# Patient Record
Sex: MALE | Race: BLACK OR AFRICAN AMERICAN | NOT HISPANIC OR LATINO | URBAN - METROPOLITAN AREA
[De-identification: names, ages, dates, MRNs, and addresses within clinical notes are randomized per-mention and may not be internally consistent; named-entity substitution may affect disease eponyms.]

---

## 2018-06-05 ENCOUNTER — OFFICE VISIT (OUTPATIENT)
Dept: URBAN - METROPOLITAN AREA CLINIC 33 | Facility: CLINIC | Age: 73
End: 2018-06-05
Payer: COMMERCIAL

## 2018-06-05 DIAGNOSIS — H35.371 PUCKERING OF MACULA, RIGHT EYE: ICD-10-CM

## 2018-06-05 PROCEDURE — 99214 OFFICE O/P EST MOD 30 MIN: CPT | Performed by: OPHTHALMOLOGY

## 2018-06-05 RX ORDER — KETOROLAC TROMETHAMINE 5 MG/ML
0.5 % SOLUTION OPHTHALMIC
Qty: 5 | Refills: 1 | Status: INACTIVE
Start: 2018-06-05 | End: 2018-09-17

## 2018-06-05 RX ORDER — PREDNISOLONE ACETATE 10 MG/ML
1 % SUSPENSION/ DROPS OPHTHALMIC
Qty: 0 | Refills: 1 | Status: INACTIVE
Start: 2018-06-05 | End: 2018-06-20

## 2018-06-05 RX ORDER — OFLOXACIN 3 MG/ML
0.3 % SOLUTION/ DROPS OPHTHALMIC
Qty: 5 | Refills: 1 | Status: INACTIVE
Start: 2018-06-05 | End: 2018-07-20

## 2018-06-05 ASSESSMENT — KERATOMETRY
OD: 43.38
OS: 43.38

## 2018-06-05 ASSESSMENT — INTRAOCULAR PRESSURE
OS: 12
OD: 12

## 2018-06-05 ASSESSMENT — VISUAL ACUITY
OS: HM
OD: 20/100

## 2018-06-05 NOTE — IMPRESSION/PLAN
Impression: Puckering of macula, right eye: H35.371. OD. Vision: vision affected.  Plan: see Note #2

## 2018-06-05 NOTE — IMPRESSION/PLAN
Impression: Central retinal artery occlusion, left eye: H34.12 OS. Condition: severe loss of vision - irreversible.  Plan: F/U with Dr Jesse Perez as scheduled

## 2018-06-05 NOTE — IMPRESSION/PLAN
Impression: Age-related nuclear cataract, bilateral: H25.13. Condition: quality of life issue. Symptoms: could improve with surgery. Vision: vision affected. Plan: Cataracts account for some decreased vision, however, the patient is aware with macular changes that level of vision post operatively cannot be determined. Discussed risks, benefits, procedures and recovery. Patient desires surgery, recommend phacoemulsification with intraocular lens. RL-2 Recommend standard IOL aim plano.

## 2018-06-20 ENCOUNTER — PRE-OPERATIVE VISIT (OUTPATIENT)
Dept: URBAN - METROPOLITAN AREA CLINIC 17 | Facility: CLINIC | Age: 73
End: 2018-06-20
Payer: COMMERCIAL

## 2018-06-20 DIAGNOSIS — H25.13 AGE-RELATED NUCLEAR CATARACT, BILATERAL: Primary | ICD-10-CM

## 2018-06-20 PROCEDURE — 92136 OPHTHALMIC BIOMETRY: CPT | Performed by: OPHTHALMOLOGY

## 2018-06-20 RX ORDER — PREDNISOLONE ACETATE 10 MG/ML
1 % SUSPENSION/ DROPS OPHTHALMIC
Qty: 1 | Refills: 1 | Status: INACTIVE
Start: 2018-06-20 | End: 2018-09-17

## 2018-06-20 ASSESSMENT — PACHYMETRY
OS: 2.66
OS: 23.08
OD: 22.53
OD: 23.40

## 2018-07-12 ENCOUNTER — SURGERY (OUTPATIENT)
Dept: URBAN - METROPOLITAN AREA SURGERY 11 | Facility: SURGERY | Age: 73
End: 2018-07-12
Payer: COMMERCIAL

## 2018-07-12 PROCEDURE — 66984 XCAPSL CTRC RMVL W/O ECP: CPT | Performed by: OPHTHALMOLOGY

## 2018-07-13 ENCOUNTER — POST-OPERATIVE VISIT (OUTPATIENT)
Dept: URBAN - METROPOLITAN AREA CLINIC 17 | Facility: CLINIC | Age: 73
End: 2018-07-13

## 2018-07-13 PROCEDURE — 99024 POSTOP FOLLOW-UP VISIT: CPT | Performed by: OPTOMETRIST

## 2018-07-13 ASSESSMENT — INTRAOCULAR PRESSURE
OS: 12
OD: 12

## 2018-07-20 ENCOUNTER — POST-OPERATIVE VISIT (OUTPATIENT)
Dept: URBAN - METROPOLITAN AREA CLINIC 18 | Facility: CLINIC | Age: 73
End: 2018-07-20

## 2018-07-20 DIAGNOSIS — Z09 ENCNTR FOR F/U EXAM AFT TRTMT FOR COND OTH THAN MALIG NEOPLM: Primary | ICD-10-CM

## 2018-07-20 PROCEDURE — 99024 POSTOP FOLLOW-UP VISIT: CPT | Performed by: OPTOMETRIST

## 2018-07-20 ASSESSMENT — INTRAOCULAR PRESSURE
OD: 8
OS: 8

## 2018-08-15 ENCOUNTER — POST-OPERATIVE VISIT (OUTPATIENT)
Dept: URBAN - METROPOLITAN AREA CLINIC 17 | Facility: CLINIC | Age: 73
End: 2018-08-15

## 2018-08-15 PROCEDURE — 99024 POSTOP FOLLOW-UP VISIT: CPT | Performed by: OPTOMETRIST

## 2018-08-15 ASSESSMENT — INTRAOCULAR PRESSURE
OD: 13
OS: 12

## 2018-08-15 ASSESSMENT — VISUAL ACUITY: OD: 20/200

## 2018-09-17 ENCOUNTER — OFFICE VISIT (OUTPATIENT)
Dept: URBAN - METROPOLITAN AREA CLINIC 17 | Facility: CLINIC | Age: 73
End: 2018-09-17
Payer: COMMERCIAL

## 2018-09-17 PROCEDURE — 92014 COMPRE OPH EXAM EST PT 1/>: CPT | Performed by: OPHTHALMOLOGY

## 2018-09-17 PROCEDURE — 92134 CPTRZ OPH DX IMG PST SGM RTA: CPT | Performed by: OPHTHALMOLOGY

## 2018-09-17 ASSESSMENT — INTRAOCULAR PRESSURE
OS: 10
OD: 9

## 2018-09-17 NOTE — IMPRESSION/PLAN
Impression: Type 2 diab w moderate nonprlf diab rtnop w macular edema, bilateral: B15.0732. OU. Condition: unstable OD / OS. Vision: vision affected OU. Plan: Discussed diagnosis in detail with patient. Discussed risks of progression. Exam OD shows active ERM with CME, Based on today's exam, diagnostic studies and review of records, recommend to start with AVASTIN and KENALOG FIFI tx in order to help reduce the swelling and prevent a further reduction in vision. Discussed the risks and benefits of tx. Patient elects to proceed with recommendation. OCT shows ERM w/ edema Patient may need additional FIFI tx or laser treatment in the future. Exam OS shows active edema with retinal artery occlusion and OCT OS shows thinning c/w Atrophy and artery occlusion. No treatment is recommend, loss of vision is permanent. Recommend observation.

## 2018-09-17 NOTE — IMPRESSION/PLAN
Impression: Central retinal artery occlusion, left eye: H34.12 OS. Condition: severe loss of vision - irreversible. Plan: Discussed diagnosis in detail with patient. Exam OS show retinal artery occlusion and OCT OS shows thinning c/w Atrophy and artery occlusion. No treatment is recommend, loss of vision is permanent. Recommend observation.

## 2018-09-17 NOTE — IMPRESSION/PLAN
Impression: Puckering of macula, right eye: H35.371. OD. Vision: vision affected. Plan: Discussed diagnosis in detail with patient. Exam and OCT OD shows ERM w/CME, see above notes.

## 2018-10-25 ENCOUNTER — OFFICE VISIT (OUTPATIENT)
Dept: URBAN - METROPOLITAN AREA CLINIC 17 | Facility: CLINIC | Age: 73
End: 2018-10-25
Payer: COMMERCIAL

## 2018-10-25 DIAGNOSIS — E11.3313 TYPE 2 DIAB W MODERATE NONPRLF DIAB RTNOP W MACULAR EDEMA, BILATERAL: Primary | ICD-10-CM

## 2018-10-25 PROCEDURE — 92134 CPTRZ OPH DX IMG PST SGM RTA: CPT | Performed by: OPHTHALMOLOGY

## 2018-10-25 PROCEDURE — 67028 INJECTION EYE DRUG: CPT | Performed by: OPHTHALMOLOGY

## 2018-10-25 PROCEDURE — 99213 OFFICE O/P EST LOW 20 MIN: CPT | Performed by: OPHTHALMOLOGY

## 2018-10-25 ASSESSMENT — INTRAOCULAR PRESSURE
OS: 10
OD: 10

## 2018-10-25 NOTE — IMPRESSION/PLAN
Impression: Type 2 diab w moderate nonprlf diab rtnop w macular edema, bilateral: E28.9383. OU. Condition: unstable OD / OS. Vision: vision affected OU. Plan: Discussed diagnosis in detail with patient. Discussed risks of progression. Exam OD shows active ERM with CME, Based on today's exam, diagnostic studies and review of records, recommend to start with AVASTIN and KENALOG FIFI tx in order to help reduce the swelling and prevent a further reduction in vision. Discussed the risks and benefits of tx. Patient elects to proceed with recommendation. OCT shows ERM w/ extensive edema OD Patient may need additional FIFI tx or laser treatment in the future. Exam OS shows active edema with retinal artery occlusion and OCT OS shows thinning c/w Atrophy and artery occlusion. No treatment is recommend, loss of vision is permanent. Recommend observation.

## 2018-11-02 ENCOUNTER — OFFICE VISIT (OUTPATIENT)
Dept: URBAN - METROPOLITAN AREA CLINIC 17 | Facility: CLINIC | Age: 73
End: 2018-11-02
Payer: COMMERCIAL

## 2018-11-02 PROCEDURE — 99213 OFFICE O/P EST LOW 20 MIN: CPT | Performed by: OPTOMETRIST

## 2018-11-02 ASSESSMENT — INTRAOCULAR PRESSURE
OS: 11
OD: 10

## 2018-11-02 NOTE — IMPRESSION/PLAN
Impression: Type 2 diab w moderate nonprlf diab rtnop w macular edema, bilateral: X07.6497. OU. Condition: unstable OD / OS. Vision: vision affected OU.
s/p Avastin/Kenalog OD Plan: Discussed diagnosis in detail with patient. IOP WNL today. Continue care with Dr. Audra Resendez.

## 2018-11-28 ENCOUNTER — OFFICE VISIT (OUTPATIENT)
Dept: URBAN - METROPOLITAN AREA CLINIC 17 | Facility: CLINIC | Age: 73
End: 2018-11-28
Payer: COMMERCIAL

## 2018-11-28 DIAGNOSIS — H34.12 CENTRAL RETINAL ARTERY OCCLUSION, LEFT EYE: ICD-10-CM

## 2018-11-28 PROCEDURE — 92134 CPTRZ OPH DX IMG PST SGM RTA: CPT | Performed by: OPHTHALMOLOGY

## 2018-11-28 PROCEDURE — 99213 OFFICE O/P EST LOW 20 MIN: CPT | Performed by: OPHTHALMOLOGY

## 2018-11-28 NOTE — IMPRESSION/PLAN
Impression: Type 2 diab w moderate nonprlf diab rtnop w macular edema, bilateral: G38.2699. OU. Condition: improving OD / unstable OS. Vision: vision affected OU.
s/p AV/Vaughn #1  OD 10/25/2018 Plan: Discussed diagnosis in detail with patient. No treatment is required at this time based on exam and OCT. Recommend observation for now. Will reassess condition in 6 wks. OCT shows a decrease in edema OD w/ERM. Exam OS shows active edema with retinal artery occlusion and OCT OS shows thinning c/w Atrophy and artery occlusion. No treatment is recommend, loss of vision is permanent. Recommend observation.

## 2018-11-28 NOTE — IMPRESSION/PLAN
Impression: Puckering of macula, right eye: H35.371. OD. Vision: vision affected. Plan: Discussed diagnosis in detail with patient. Exam and OCT OD shows a stable ERM -  see above notes.

## 2019-02-14 ENCOUNTER — OFFICE VISIT (OUTPATIENT)
Dept: URBAN - METROPOLITAN AREA CLINIC 17 | Facility: CLINIC | Age: 74
End: 2019-02-14
Payer: COMMERCIAL

## 2019-02-14 PROCEDURE — 92134 CPTRZ OPH DX IMG PST SGM RTA: CPT | Performed by: OPHTHALMOLOGY

## 2019-02-14 PROCEDURE — 67028 INJECTION EYE DRUG: CPT | Performed by: OPHTHALMOLOGY

## 2019-02-14 PROCEDURE — 92014 COMPRE OPH EXAM EST PT 1/>: CPT | Performed by: OPHTHALMOLOGY

## 2019-02-14 ASSESSMENT — INTRAOCULAR PRESSURE
OD: 10
OS: 10

## 2019-02-14 NOTE — IMPRESSION/PLAN
Impression: Type 2 diab w moderate nonprlf diab rtnop w macular edema, bilateral: V39.5054. OU. Condition: improving OD / unstable OS. Vision: vision affected OU.
s/p AV/Anuj #1  OD 10/25/2018 Plan: Discussed diagnosis in detail with patient. Discussed risks of progression. Based on today's exam, diagnostic studies and review of records, recommend to continue with FIFI tx AV/ANUJ in [place] in order to help reduce the swelling and prevent a further reduction in vision. An examination that was significantly and separately identifiable from the procedure was performed today. Discussed the risks and benefits of tx. Patient elects to proceed with recommendation. OCT shows increased edema w/ ERM OD Exam OS shows active edema with retinal artery occlusion and OCT OS shows thinning c/w Atrophy and artery occlusion. No treatment is recommend, loss of vision is permanent. Recommend observation. Patient may need additional FIFI tx or laser treatment in the future.

## 2019-04-29 ENCOUNTER — OFFICE VISIT (OUTPATIENT)
Dept: URBAN - METROPOLITAN AREA CLINIC 17 | Facility: CLINIC | Age: 74
End: 2019-04-29
Payer: COMMERCIAL

## 2019-04-29 PROCEDURE — 99213 OFFICE O/P EST LOW 20 MIN: CPT | Performed by: OPHTHALMOLOGY

## 2019-04-29 PROCEDURE — 92134 CPTRZ OPH DX IMG PST SGM RTA: CPT | Performed by: OPHTHALMOLOGY

## 2019-04-29 ASSESSMENT — INTRAOCULAR PRESSURE
OS: 12
OD: 11

## 2019-04-29 NOTE — IMPRESSION/PLAN
Impression: Type 2 diab w moderate nonprlf diab rtnop w macular edema, bilateral: V45.0606. OU. Condition: improving OD / unstable OS. Vision: vision affected OU.
s/p AV / Herb Deshpande #2  OD 02/14/3029, AV/Vaughn #1  OD 10/25/2018 Plan: Discussed diagnosis in detail with patient. Exam OD shows a decrease in edema and OCT OD shows a decrease in edema also. Condition is improving post FIFI tx with AVASTIN and KENALOG. Recommend a follow - up in 6 wks to reassess condition. Exam OS shows active edema with retinal artery occlusion and OCT OS shows thinning c/w Atrophy and artery occlusion. No treatment is recommend, loss of vision is permanent. Recommend observation.

## 2019-06-12 ENCOUNTER — OFFICE VISIT (OUTPATIENT)
Dept: URBAN - METROPOLITAN AREA CLINIC 17 | Facility: CLINIC | Age: 74
End: 2019-06-12
Payer: COMMERCIAL

## 2019-06-12 PROCEDURE — 92014 COMPRE OPH EXAM EST PT 1/>: CPT | Performed by: OPHTHALMOLOGY

## 2019-06-12 PROCEDURE — 92134 CPTRZ OPH DX IMG PST SGM RTA: CPT | Performed by: OPHTHALMOLOGY

## 2019-06-12 ASSESSMENT — INTRAOCULAR PRESSURE
OD: 7
OS: 6

## 2019-06-12 NOTE — IMPRESSION/PLAN
Impression: Puckering of macula, right eye: H35.371. OD. Vision: vision affected. Plan: Discussed diagnosis in detail with patient. Exam and OCT OD shows a stable ERM -  Discussed with patient in the future may need surgery to remove the scar tissue to help improve the vision and decreased the swelling.

## 2019-06-12 NOTE — IMPRESSION/PLAN
Impression: Type 2 diab w moderate nonprlf diab rtnop w macular edema, bilateral: X76.3547. OU. Condition: unstable OD / unstable OS. Vision: vision affected OU.
s/p AV / Chance Dimsherley #2  OD 02/14/3029, AV/Vaughn #1  OD 10/25/2018 Plan: Discussed diagnosis in detail with patient. Exam OD shows increased in edema and OCT OD shows a increased in edema also. Discussed diagnosis in detail with patient. Discussed risks of progression. Recommend Macular Photocoagulation Laser treatment MAP RIGHT EYE ONLY to help reduce the swelling and prevent a further reduction in vision. Discussed risks/benefits of laser TX. All questions answered. RL1 Patient understands that additional FIFI treatments or laser treatments may be needed in the future. Exam OS shows active edema with retinal artery occlusion and OCT OS shows thinning c/w Atrophy and artery occlusion. No treatment is recommend, loss of vision is permanent. Recommend observation.

## 2019-07-16 ENCOUNTER — SURGERY (OUTPATIENT)
Dept: URBAN - METROPOLITAN AREA SURGERY 7 | Facility: SURGERY | Age: 74
End: 2019-07-16
Payer: COMMERCIAL

## 2019-07-16 PROCEDURE — 66170 GLAUCOMA SURGERY: CPT | Performed by: OPHTHALMOLOGY

## 2019-07-23 ENCOUNTER — POST-OPERATIVE VISIT (OUTPATIENT)
Dept: URBAN - METROPOLITAN AREA CLINIC 17 | Facility: CLINIC | Age: 74
End: 2019-07-23

## 2019-07-23 PROCEDURE — 99024 POSTOP FOLLOW-UP VISIT: CPT | Performed by: OPTOMETRIST

## 2019-07-23 ASSESSMENT — INTRAOCULAR PRESSURE
OD: 9
OS: 10

## 2019-09-04 ENCOUNTER — POST-OPERATIVE VISIT (OUTPATIENT)
Dept: URBAN - METROPOLITAN AREA CLINIC 17 | Facility: CLINIC | Age: 74
End: 2019-09-04
Payer: COMMERCIAL

## 2019-09-04 PROCEDURE — 99024 POSTOP FOLLOW-UP VISIT: CPT | Performed by: OPHTHALMOLOGY

## 2019-09-04 ASSESSMENT — INTRAOCULAR PRESSURE
OS: 9
OD: 13

## 2020-02-19 ENCOUNTER — OFFICE VISIT (OUTPATIENT)
Dept: URBAN - METROPOLITAN AREA CLINIC 17 | Facility: CLINIC | Age: 75
End: 2020-02-19
Payer: COMMERCIAL

## 2020-02-19 PROCEDURE — 92134 CPTRZ OPH DX IMG PST SGM RTA: CPT | Performed by: OPHTHALMOLOGY

## 2020-02-19 PROCEDURE — 99213 OFFICE O/P EST LOW 20 MIN: CPT | Performed by: OPHTHALMOLOGY

## 2020-02-19 ASSESSMENT — INTRAOCULAR PRESSURE
OS: 12
OD: 8

## 2020-02-19 NOTE — IMPRESSION/PLAN
Impression: Puckering of macula, right eye: H35.371. OD. Condition: stable. Plan: Discussed diagnosis in detail with patient. No treatment is required at this time. Will continue to observe condition and or symptoms. OCT shows active edema with ERM OD.

## 2020-02-19 NOTE — IMPRESSION/PLAN
Impression: Type 2 diabetes mellitus w/ proliferative diabetic retinopathy w/o macular edema, left eye: M02.5827. OS. Condition: unstable. Vision: vision affected. Plan: Discussed diagnosis in detail with patient. Discussed risks of progression. Based on today's exam, diagnostic studies and review of records, recommend Intravitreal Injection Treatment to help reduce the bleeding and slow down new blood vessel growth in order to prevent a further reduction in vision. Discussed the risks and benefits of tx. All questions answered. Patient elects to proceed with recommendation. OCT shows thinning c/w Atrophy and CRAO OS. Patient understands that additional FIFI treatments or laser treatments may be needed in the future.

## 2020-02-19 NOTE — IMPRESSION/PLAN
Impression: Type 2 diab w moderate nonprlf diab rtnop w macular edema, right eye: G70.1902. OD. Condition: worsening. Vision: vision affected. s/p Map OD 07/16/2019 
s/p AV / Janie Lino #2  OD 02/14/3029, AV/Vaughn #1  OD 10/25/2018 Plan: Discussed diagnosis in detail with patient. Discussed risks of progression. Based on today's exam, diagnostic studies and review of records, recommend to continue with FIFI tx in order to help reduce the swelling and prevent a further reduction in vision. Discussed the risks and benefits of tx. All questions answered. Patient elects to proceed with recommendation. OCT shows active edema with ERM OD. Patient may need additional FIFI tx or laser treatment in the future.

## 2020-07-30 ENCOUNTER — OFFICE VISIT (OUTPATIENT)
Dept: URBAN - METROPOLITAN AREA CLINIC 17 | Facility: CLINIC | Age: 75
End: 2020-07-30
Payer: COMMERCIAL

## 2020-07-30 DIAGNOSIS — H25.12 AGE-RELATED NUCLEAR CATARACT, LEFT EYE: ICD-10-CM

## 2020-07-30 PROCEDURE — 99213 OFFICE O/P EST LOW 20 MIN: CPT | Performed by: OPHTHALMOLOGY

## 2020-07-30 ASSESSMENT — INTRAOCULAR PRESSURE
OD: 12
OS: 14

## 2020-07-30 NOTE — IMPRESSION/PLAN
Impression: Type 2 diabetes mellitus w/ proliferative diabetic retinopathy w/o macular edema, left eye: H39.1381. OS. Condition: unstable. Vision: vision affected. Plan: Discussed diagnosis in detail with patient. Exam shows no Diabetic changes OS. No treatment is recommended at this time. Emphasized blood sugar control and advised to keep future appointments with PCP and/or Endocrinologist for the management of Diabetes. Recommend observation for now. OCT shows no active Edema OS.

## 2020-07-30 NOTE — IMPRESSION/PLAN
Impression: Type 2 diab w moderate nonprlf diab rtnop w macular edema, right eye: X97.6135. OD. Condition: worsening. Vision: vision affected. s/p Map OD 07/16/2019 
s/p AV / Norbert Bleak #2  OD 02/14/2019, AV/Vaughn #1  OD 10/25/2018 Plan: Discussed diagnosis in detail with patient. Discussed risks of progression. Based on today's exam, diagnostic studies and review of records, recommend to continue with FIFI tx in order to help reduce the swelling and prevent a further reduction in vision. Discussed the risks and benefits of tx. All questions answered. Patient elects to proceed with recommendation. OCT shows active edema with ERM OD. Patient may need additional FIFI tx or laser treatment in the future.

## 2020-07-30 NOTE — IMPRESSION/PLAN
Impression: Age-related nuclear cataract, left eye: H25.12. OS. Condition: worsening. Vision: vision affected. Plan: Discussed diagnosis in detail with patient. No treatment is required at this time. Will continue to observe condition and or symptoms.

## 2020-08-27 ENCOUNTER — OFFICE VISIT (OUTPATIENT)
Dept: URBAN - METROPOLITAN AREA CLINIC 17 | Facility: CLINIC | Age: 75
End: 2020-08-27
Payer: COMMERCIAL

## 2020-08-27 DIAGNOSIS — E11.3592 TYPE 2 DIABETES MELLITUS W/ PROLIFERATIVE DIABETIC RETINOPATHY W/O MACULAR EDEMA, LEFT EYE: ICD-10-CM

## 2020-08-27 DIAGNOSIS — E11.3311 TYPE 2 DIAB W MODERATE NONPRLF DIAB RTNOP W MACULAR EDEMA, RIGHT EYE: Primary | ICD-10-CM

## 2020-08-27 PROCEDURE — 92134 CPTRZ OPH DX IMG PST SGM RTA: CPT | Performed by: OPHTHALMOLOGY

## 2020-08-27 PROCEDURE — 99213 OFFICE O/P EST LOW 20 MIN: CPT | Performed by: OPHTHALMOLOGY

## 2020-08-27 ASSESSMENT — INTRAOCULAR PRESSURE
OS: 12
OD: 16

## 2020-08-27 NOTE — IMPRESSION/PLAN
Impression: Type 2 diab w moderate nonprlf diab rtnop w macular edema, right eye: Z29.1886. OD. Condition: worsening. Vision: vision affected. s/p Map OD 07/16/2019 
s/p AV / Dion Akin #2  OD 02/14/2019, AV/Vaughn #1  OD 10/25/2018 Plan: Due to Coronavirus COVID-19 pandemic and National Emergency, deferred Slit Lamp examination. Findings are based on OCT and Optos. Discussed diagnosis in detail with patient. Patient was scheduled for Avastin inj on DOS 08/05/2020 ordered on DOS 07/30/2020 and he missed his appointment. Discussed risks of progression. Based on today's exam, diagnostic studies and review of records, recommend Intravitreal Injection tx RIGHT EYE in order to help reduce the swelling and prevent a further reduction in vision. Discussed the risks and benefits of tx. All questions answered. Patient elects to proceed with recommendation. OCT and Optos shows edema with ERM / ILM change OD. Patient may need additional FIFI tx or laser treatment in the future. Patient does not want treatment today, will schedule treatment for 09/24/2020, stress compliance. Emphasized blood sugar control and advised to keep future appointments with PCP and/or Endocrinologist for the management of Diabetes.

## 2020-08-27 NOTE — ASSESSMENT/PLAN
Impression: Diagnostic Test - Photo Optos: Good-OS shows no edema w/ ERM / ILM change Plan: see Imp / Plan

## 2020-08-27 NOTE — ASSESSMENT/PLAN
Impression: Diagnostic Test - Photo Optos: Good-OD shows edema w/minimal ILM / ERM change Plan: see Imp / Plan

## 2020-08-27 NOTE — IMPRESSION/PLAN
Impression: Type 2 diabetes mellitus w/ proliferative diabetic retinopathy w/o macular edema, left eye: F93.8568. OS. Condition: stable. Vision: vision affected. Plan: Due to Coronavirus COVID-19 pandemic and National Emergency, deferred Slit Lamp examination. Findings are based on OCT and Optos. OCT and Optos shows no edema w/ILM / ERM change OS. No treatment is require at this time. Recommend a retina follow - up in 2 - 3 mos.

## 2020-08-27 NOTE — ASSESSMENT/PLAN
Impression: Diagnostic Test - OCT MAC: Good-OD shows edema w/minimal ILM / ERM change Plan: see Imp / Plan

## 2020-09-24 ENCOUNTER — PROCEDURE (OUTPATIENT)
Dept: URBAN - METROPOLITAN AREA CLINIC 17 | Facility: CLINIC | Age: 75
End: 2020-09-24
Payer: COMMERCIAL

## 2020-09-24 PROCEDURE — 67028 INJECTION EYE DRUG: CPT | Performed by: OPHTHALMOLOGY

## 2022-09-30 ENCOUNTER — OFFICE VISIT (OUTPATIENT)
Dept: URBAN - METROPOLITAN AREA CLINIC 17 | Facility: CLINIC | Age: 77
End: 2022-09-30
Payer: MEDICARE

## 2022-09-30 DIAGNOSIS — E11.3552 TYPE 2 DIABETES WITH STABLE PROLIF DIABETIC RTNOP, LEFT EYE: ICD-10-CM

## 2022-09-30 DIAGNOSIS — E11.3311 TYPE 2 DIABETES MELLITUS WITH MODERATE NONPROLIFERATIVE DIABETIC RETINOPATHY WITH MACULAR EDEMA, RIGHT EYE: ICD-10-CM

## 2022-09-30 DIAGNOSIS — H25.12 AGE-RELATED NUCLEAR CATARACT, LEFT EYE: ICD-10-CM

## 2022-09-30 DIAGNOSIS — E11.3391 TYPE 2 DIAB W MODERATE NONPRLF DIAB RTNOP W/O MACULAR EDEMA, RIGHT EYE: Primary | ICD-10-CM

## 2022-09-30 PROCEDURE — 92134 CPTRZ OPH DX IMG PST SGM RTA: CPT | Performed by: OPTOMETRIST

## 2022-09-30 PROCEDURE — 99204 OFFICE O/P NEW MOD 45 MIN: CPT | Performed by: OPTOMETRIST

## 2022-09-30 ASSESSMENT — INTRAOCULAR PRESSURE
OS: 10
OD: 9

## 2022-09-30 NOTE — IMPRESSION/PLAN
Impression: Age-related nuclear cataract, left eye: H25.12.  Plan: Consider CAT eval after retina is stable

## 2022-09-30 NOTE — IMPRESSION/PLAN
Impression: Type 2 diabetes with stable prolif diabetic rtnop, left eye: A70.0045. Plan: Discussed diagnosis in detail with patient. Discussed risks and benefits and patient understands. Advised patient of condition. Consult recommended [[facility]  Consider CAT eval after retina is stable

## 2022-09-30 NOTE — IMPRESSION/PLAN
Impression: Type 2 diab w moderate nonprlf diab rtnop w/o macular edema, right eye: X33.4886. Plan: Diabetes type II: moderate background diabetic retinopathy, no signs of neovascularization noted. Will refer patient for a retinal consult to determine if treatment is necessary. Discussed ocular and systemic benefits of maintaining blood sugar control.